# Patient Record
Sex: MALE | Race: WHITE
[De-identification: names, ages, dates, MRNs, and addresses within clinical notes are randomized per-mention and may not be internally consistent; named-entity substitution may affect disease eponyms.]

---

## 2023-05-06 ENCOUNTER — HOSPITAL ENCOUNTER (EMERGENCY)
Dept: HOSPITAL 75 - ER | Age: 18
Discharge: HOME | End: 2023-05-06
Payer: SELF-PAY

## 2023-05-06 VITALS — WEIGHT: 134.92 LBS | BODY MASS INDEX: 21.18 KG/M2 | HEIGHT: 66.93 IN

## 2023-05-06 VITALS — DIASTOLIC BLOOD PRESSURE: 72 MMHG | SYSTOLIC BLOOD PRESSURE: 125 MMHG
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
## 2023-05-06 DIAGNOSIS — S92.511A: Primary | ICD-10-CM

## 2023-05-06 PROCEDURE — 28515 TREATMENT OF TOE FRACTURE: CPT

## 2023-05-06 PROCEDURE — 28660 TREAT TOE DISLOCATION: CPT

## 2023-05-06 PROCEDURE — 73630 X-RAY EXAM OF FOOT: CPT

## 2023-05-06 NOTE — DIAGNOSTIC IMAGING REPORT
INDICATION: Status post reduction of the fifth toe.



COMPARISON: 11/16/2015.



FINDINGS:

There is no current dislocation, though there does appear to be

an obliquely oriented fracture of the proximal phalanx of the

fifth toe. The remainder of the toes are unremarkable. The

midfoot and hindfoot appear appropriate.



IMPRESSION:

1. No current dislocation. There is a minimally displaced

obliquely oriented fracture of the proximal phalanx of the fifth

toe.



Dictated by: 



  Dictated on workstation # [id number]

## 2023-05-06 NOTE — ED LOWER EXTREMITY
General


Chief Complaint:  Lower Extremity


Stated Complaint:  INJ RIGHT PINKIE TOE


Source:  patient


Exam Limitations:  no limitations


 (ANDREA FRANKEL)





History of Present Illness


Date Seen by Provider:  May 6, 2023


Time Seen by Provider:  16:28


Initial Comments


Patient is a 18-year-old male presents ED with a injury to right pinky toe.  

Patient states he injured his right foot around 330.  Accidentally hit a 

cylinder staircase.  Report a deformity of his right pinky toe.  Went to urgent 

care concerning for fracture or dislocation.  Denied of any bleeding.  Was not 

able to reduce at urgent care and was sent to the ED for further evaluation.  

Reports some numbness and tingling distally


 (ANDREA FRANKEL)





Allergies and Home Medications


Allergies


Coded Allergies:  


     Amoxicillin (Unverified  Allergy, Mild, RASH, 3/28/12)





Patient Home Medication List


Home Medication List Reviewed:  Yes


 (ANDREA FRANKEL)


Cetirizine Hcl (Cetirizine Hcl) 1 Mg/1 Ml Solution, 1 TSP PO DAILY, (Reported)


   Entered as Reported by: JOSE LUIS VALLECILLO on 3/28/12 1402


Hydrocodone/Acetaminophen (Hydrocodone-Acetamin 5-325 mg) 5 Mg-325 Mg Tablet, 1 

TAB PO Q4H PRN for PAIN-MODERATE (5-7)


   Prescribed by: JOSEPH HERNANDEZ on 5/6/23 1809


Ibuprofen (Ibuprofen) 600 Mg Tablet, 600 MG PO Q8H


   Prescribed by: JOSEPH HERNANDEZ on 5/6/23 1809


Omeprazole (Prilosec) 20 Mg Capsule.dr, 20 MG PO, (Reported)


   Entered as Reported by: JOSE LUIS VALLECILLO on 3/28/12 1402


[Bactrim Tablet]  , 1 TAB PO BID, (Reported)


   Entered as Reported by: EZRA CARUSO on 4/5/12 0931





Review of Systems


Constitutional:  No chills, No diaphoresis, No fever, No malaise, No weakness


EENTM:  No ear pain, No blurred vision, No double vision


Respiratory:  No cough, No dyspnea on exertion


Cardiovascular:  No chest pain


Gastrointestinal:  No abdominal pain, No diarrhea, No nausea, No vomiting


Genitourinary:  No decreased output, No discharge


Musculoskeletal:  No back pain; joint pain, joint swelling (ANDREA FRANKEL)





All Other Systems Reviewed


Negative Unless Noted:  Yes


 (ANDREA FRANKEL)





Past Medical-Social-Family Hx


Past Medical History


Reproductive Disorders:  No


 (ANDREA FRANKEL)





Physical Exam


Vital Signs





Vital Signs - First Documented








 5/6/23





 16:27


 


Temp 35.8


 


Pulse 81


 


Resp 16


 


B/P (MAP) 140/77 (98)


 


Pulse Ox 98








 (MARIO MESSER MD)


Vital Signs


Capillary Refill :  


 (ANDREA FRANKEL)


Height, Weight, BMI


Height: '"


Weight: lbs. oz. kg;  BMI


Method:


General Appearance:  WD/WN, no apparent distress


HEENT:  PERRL/EOMI, normal ENT inspection, TMs normal, pharynx normal


Neck:  non-tender, full range of motion, supple, normal inspection


Cardiovascular:  regular rate, rhythm, no edema, no gallop, no JVD


Respiratory:  chest non-tender, lungs clear, normal breath sounds


Gastrointestinal:  normal bowel sounds, non tender, soft, no organomegaly


Back:  normal inspection


Hips:  bilateral hip non-tender, bilateral hip normal inspection, bilateral hip 

normal range of motion


Knees:  bilateral knee non-tender, bilateral knee normal inspection, bilateral 

knee normal range of motion


Ankles:  bilateral ankle non-tender, bilateral ankle normal inspection, 

bilateral ankle normal range of motion


Feet:  right foot soft tissue tenderness (Tenderness to right little pinky toe 

with deformity neurovascular intact.), right foot swelling


Neurologic/Psychiatric:  CNs II-XII nml as tested, no motor/sensory deficits, 

alert, normal mood/affect, oriented x 3


Skin:  normal color, warm/dry, other (No active bleeding or open wound of the 

right pinky toe) (ANDREA FRANKEL)





Procedures/Interventions


Splinting and Joint Reduction :  


   Pre-Proc Neuro Vasc Exam:  normal


   Post-Proc Neuro Vasc Exam:  normal


Progress


Fracture to right fifth proximal phalanx of fifth toe.  Lidocaine 1% 3 ml four-

way digital block of right fifth toe.  Near complete reduction of the fracture 

site.  Neurovascularly intact pre and post reduction


   Pre-Procedure NV Exam:  Yes


   post joint reduction film:  Fracture site reduced


 (ANDREA FRANKEL)





Progress/Results/Core Measures


Results/Orders


Medications Given in ED





Current Medications








 Medications  Dose


 Ordered  Sig/Wallace


 Route  Start Time


 Stop Time Status Last Admin


Dose Admin


 


 Lidocaine HCl  20 ml  ONCE  ONCE


 INJ  5/6/23 16:30


 5/6/23 16:31 DC 5/6/23 16:50


20 ML





 (MARIO MESSER MD)


Vital Signs/I&O











 5/6/23 5/6/23





 16:27 18:28


 


Temp 35.8 


 


Pulse 81 75


 


Resp 16 16


 


B/P (MAP) 140/77 (98) 125/72


 


Pulse Ox 98 98





 (MARIO MESSER MD)





Departure


Communication (PCP)


Patient was sent over from urgent care for a dislocated fracture of his right 

fifth toe.  This occurred today.  On exam deformity noted.  Neurovascular 

intact.  Four-way digital block with 1% lidocaine.  Procedure document note.  

Near Successful reduction of fracture site.  X-ray showed a mildly displaced 

oblique oriented fracture of the proximal phalanx of the fifth toe.  Patient was

placed in a postop shoe.  No evidence of bleeding suggesting open fracture.  

Tolerated procedure well.  Buddy taped to his fourth toe to help keep fracture 

site approximate.  Continue with buddy tape.  Postop shoe.  Limited 

weightbearing to prevent fracture site from displacing.  Crutches as needed.  

Follow-up with orthopedic this next week for reevaluation.  Will discharge with 

hydrocodone as needed.  Take ibuprofen daily for pain and swelling.  Return back

to ED if symptoms worsen such as redness, swelling or deformity





 (ANDREA FRANKEL)





Impression





   Primary Impression:  


   Fracture of foot


Disposition:  01 HOME, SELF-CARE


Condition:  Stable





Admissions


Decision to Admit Reason:  Admit from ER (General)


Decision to Admit/Date:  May 6, 2023


Time/Decision to Admit Time:  18:07


 (ANDREA FRANKEL)





Departure-Patient Inst.


Decision time for Depature:  18:07


 (ANDREA FRANKEL)


Referrals:  


SHAHEED ORNELAS MD (PCP/Family)


Primary Care Physician








PATRICIA HOFFMAN MD


Patient Instructions:  Foot Fracture ED





Add. Discharge Instructions:  


Recommend contacted Dr. Hoffman's office on Monday to follow-up with them.  

Limited weightbearing to help keep the fracture site intact.  Take ibuprofen 

daily.  For breakthrough pain hydrocodone.  buddy tape





All discharge instructions reviewed with patient and/or family. Voiced 

understanding.


Scripts


Ibuprofen (Ibuprofen) 600 Mg Tablet


600 MG PO Q8H for PAIN, #20 TAB 0 Refills


   Prov: ANDREA FRANKEL         5/6/23 


Hydrocodone/Acetaminophen (Hydrocodone-Acetamin 5-325 mg) 5 Mg-325 Mg Tablet


1 TAB PO Q4H PRN for PAIN-MODERATE (5-7), #8 TAB


   Prov: ANDREA FRANKEL         5/6/23








ATTENDING PHYSICIAN NOTE:


I was physically present as attending physician in the emergency department 

during the care of this patient, but I was not directly involved in the decision

making or delivery of care for this patient. 


 (MARIO MESSER MD)











ANDREA FRANKEL           May 6, 2023 16:32


MARIO MESSER MD         May 6, 2023 19:28